# Patient Record
(demographics unavailable — no encounter records)

---

## 2024-11-19 NOTE — ASSESSMENT
[FreeTextEntry1] : 90 year old male with hypertension, HLD, DM, COPD, , history of lung cancer post resection in 2014- squamous cell carcinoma stage I complicated by recurrent pleural effusion, post pleurodesis of left pleural space.  Also with CKD and pulmonary hypertension, TR.  Most recently with KIYA on CKD while in [place], attributed to dehydration/possible overdiuresis in setting of warm temperatures. Also with diverticulosis, resulting in rectal bleeding and anemia.  REcently admitted to [place] where colonoscopy showed several bleeding avms which were cauterized.  This was complicated by a microperforation which healed, not requiring surgery.  Now with stable Hb on Eliquis 2.5 BID.   Since anemia is improved, CV status and CKD have also improved.  Last BUN/CR prior to last visit to me was 41/1.4. Diagnosed with pneumonia in June 2024 and treated with antibiotics with improvement. Today comes in for evaluation of cough post viral URI.  He is already improving.  Denies wheezing, has some stable exertional dyspnea.  Las ON exam today VSS lungs are clear.  Oxygen sat at rest is 96%. has 2+ LE edema. Recent labs notable for Hb 9.7, and Bun/Cr 50/1.4 IMpression:  URI with cough that is improving.  No evidence of bacterial infection. Also with rhinorrhea which sounds non allergic which may be contributing to cough.  Plan: Continue Wixela 250/50 BID albuterol as needed ipratropium bromide nasal spray BID- to be cleared for use by his opthalmologist.  Hes had complex eye history with corneal replacement, and treatment of glaucoma many years earlier. F/U in 3 months.

## 2024-11-19 NOTE — REVIEW OF SYSTEMS
[Postnasal Drip] : postnasal drip [Cough] : cough [Sputum] : sputum [Anemia] : anemia [Blood Transfusion] : blood transfusion [History of Iron Deficiency] : history of iron deficiency [Negative] : Gastrointestinal

## 2024-11-19 NOTE — PHYSICAL EXAM
[No Acute Distress] : no acute distress [Normal Oropharynx] : normal oropharynx [Normal Appearance] : normal appearance [No Neck Mass] : no neck mass [Normal Rate/Rhythm] : normal rate/rhythm [Normal S1, S2] : normal s1, s2 [No Murmurs] : no murmurs [No Resp Distress] : no resp distress [Clear to Auscultation Bilaterally] : clear to auscultation bilaterally [No Abnormalities] : no abnormalities [No Clubbing] : no clubbing [No Cyanosis] : no cyanosis [1+ Pitting] : 1+ pitting [Oriented x3] : oriented x3 [Normal Affect] : normal affect

## 2024-11-19 NOTE — HISTORY OF PRESENT ILLNESS
[TextBox_4] : Mr. Wilde comes in for follow up today. He had a URI one month ago, did not take antibiotics or cortisone. Gradually improved but cough persisted.  Cough, is productive white -yellow phlegm. He denies dyspnea with normal activities but experiences dyspnea up stairs.  Denies fever currently or chest pain.  Has some rhinorrhea  Denies allergies. Using Wixela 250/50 BID and albuterol 1-2 x per day as needed, not every day. Recent labs:  Hb 9.7, Hct 31.8 11/14/24 BUN/Cr 50/1.54 Fe 24 TIBC 244 Iron sat 10. Had some hematuria when he saw Dr. Donnelly.  Cytology of urine was negative.

## 2024-11-19 NOTE — ASSESSMENT
[FreeTextEntry1] : 90 year old male with hypertension, HLD, DM, COPD, , history of lung cancer post resection in 2014- squamous cell carcinoma stage I complicated by recurrent pleural effusion, post pleurodesis of left pleural space.  Also with CKD and pulmonary hypertension, TR.  Most recently with KIYA on CKD while in [place], attributed to dehydration/possible overdiuresis in setting of warm temperatures. Also with diverticulosis, resulting in rectal bleeding and anemia.  REcently admitted to [place] where colonoscopy showed several bleeding avms which were cauterized.  This was complicated by a microperforation which healed, not requiring surgery.  Now with stable Hb on Eliquis 2.5 BID.   Since anemia is improved, CV status and CKD have also improved.  Last BUN/CR prior to last visit to me was 41/1.4. Diagnosed with pneumonia in June 2024 and treated with antibiotics with improvement. Today comes in for evaluation of cough post viral URI.  He is already improving.  Denies wheezing, has some stable exertional dyspnea.  Las ON exam today VSS lungs are clear.  Oxygen sat at rest is 96%. has 2+ LE edema. Recent labs notable for Hb 9.7, and Bun/Cr 50/1.4 IMpression:  URI with cough that is improving.  No evidence of bacterial infection. Also with rhinorrhea which sounds non allergic which may be contributing to cough.  Plan: Continue Wixela 250/50 BID albuterol as needed ipratropium bromide nasal spray BID- to be cleared for use by his opthalmologist.  Hes had complex eye history with corneal replacement, and treatment of glaucoma many years earlier. F/U in 3 months.

## 2025-07-15 NOTE — END OF VISIT
[FreeTextEntry3] :  Total time spent caring for the patient today was  45  minutes.  This includes time spent before the visit reviewing the chart, during the visit speaking to the patient and performing an examination, personally reviewing chest imaging and labs and time spent after the visit on documentation, and discussion with Dr. Haney. [Time Spent: ___ minutes] : I have spent [unfilled] minutes of time on the encounter which excludes teaching and separately reported services.

## 2025-07-15 NOTE — HISTORY OF PRESENT ILLNESS
[Former] : former [TextBox_4] :  91 year old male with hypertension, HLD, DM, COPD, , history of lung cancer post resection in 2014- squamous cell carcinoma stage I complicated by recurrent pleural effusion, post pleurodesis of left pleural space. Also with CKD and pulmonary hypertension, TR. Also with diverticulosis, resulting in rectal bleeding and anemia. Admitted to [place] in May 2024, where colonoscopy showed several bleeding avms which were cauterized. This was complicated by a microperforation which healed, not requiring surgery. Now with stable Hb on Eliquis 2.5 BID. Since anemia improved, CV status and CKD have also improved.  BUN/Cr in 1/2025 was 56/1.76. Following that admission he was diagnosed with pneumonia in June 2024 and treated with antibiotics with improvement. Last seen in 4/2025 with cough post viral URI. Returns for follow up today.  He is reporting increasing shortness of breath with activity,  He has swollen lower extremities.  Reports weight gain of about 10 lbs.  Denies wheezing or increased cough. Brought his meds today: Taking torsemide 10mg 4 days a week, 5mg 3 days a week, metoprolol ER25mg, spironolactone 25mg daily, eliquis 2.5 BID, folic acid 1 mg, gabapentin 300simvastatin, allopurinol . He is on Advair 250/50 BID and using albuterol up to 2x per day.  Cannot take an anticholinergic due to glaucoma. Denies infectious symptoms.     Has appointment to see Dr. Haney, nephrology, later this week.  Labs drawn 7/14/25 are notable for normal WBC, Hb is 9, BUN/Cr is 70/2.18 (baseline 57/1.77)

## 2025-07-15 NOTE — ASSESSMENT
[FreeTextEntry1] : 90 year old male with hypertension, HLD, DM, COPD, , history of lung cancer post resection in 2014- squamous cell carcinoma stage I complicated by recurrent pleural effusion, post pleurodesis of left pleural space. Also with CKD and pulmonary hypertension, TR. Most recently with KIYA on CKD while in [place], attributed to dehydration/possible overdiuresis in setting of warm temperatures. Also with diverticulosis, resulting in rectal bleeding and anemia. Admitted to [place] last May 2024 where colonoscopy showed several bleeding avms which were cauterized. This was complicated by a microperforation which healed, not requiring surgery. Now with stable Hb on Eliquis 2.5 BID. Since anemia is improved, CV status and CKD have also improved. Diagnosed with pneumonia in June 2024 and treated with antibiotics with improvement. Also with pneumonia diagnosed in March treated with prednisone, doxycycline, augmentin and duonebs. He recovered after that episode.  Now returns with increasing dyspnea with even minimal activity.  Denies wheezing, cough or infectious symptoms. Also with recent weight gain- 10 lbs and increased LE edema.    PE is notable for exertional hypoxia.  BP and p are stable.  LUngs are clear. Cor RRR 3/6 sys murmur noted.  ABdomen is full, ext with 2-3+ bilateral edema.  He is on ADvair 250/50 BID and using albuterol 1-2x per day.  IMpression:  Exertional dyspnea is most likely related to volume overload.  He has emphysema and abdominal fullness is likely causing basilar atelectasis and increased WOB. Cannot rule out pneumonia or inflammatory process or pleural effusion related to volume overload.  Oxygen saturation is 95% at rest at RA 88% while ambulating at room air 97% at rest on 2 L NC oxygen 93% while ambulating on 2 L NC oxygen.  Plan: 1- Portable concentrator ordered for use while sleeping, walking and to bring on plane when he travels to [facility] 2- discussed raising diuretics with Dr. Haney. INstructed to take additional torsemide 10mg daily today and to take 20mg daily starting tomorrow.  To see Dr. Haney on 7/17/25 3- increased ADvair to 500/50 BID and encouraged to use albuterol when needed 4- CT Chest ordered to better evaluate pleura and lung parenchyma. Will advise after CT results.

## 2025-07-15 NOTE — REVIEW OF SYSTEMS
[Recent Wt Gain (___ Lbs)] : ~T recent [unfilled] lb weight gain [SOB on Exertion] : sob on exertion [Edema] : edema [Negative] : Gastrointestinal